# Patient Record
Sex: FEMALE | URBAN - METROPOLITAN AREA
[De-identification: names, ages, dates, MRNs, and addresses within clinical notes are randomized per-mention and may not be internally consistent; named-entity substitution may affect disease eponyms.]

---

## 2015-03-16 LAB
EXT 24 HR UR METANEPHRINE: NORMAL
EXT 24 HR UR NORMETANEPHRINE: NORMAL
EXT 24 HR UR NORMETANEPHRINE: NORMAL
EXT 25 HYDROXY VIT D2: NORMAL
EXT 25 HYDROXY VIT D3: NORMAL
EXT 5 HIAA 24 HR URINE: NORMAL
EXT 5 HIAA BLOOD: NORMAL
EXT ACTH: NORMAL
EXT AFP: NORMAL
EXT ALBUMIN: NORMAL
EXT ALKALINE PHOSPHATASE: NORMAL
EXT ALT: NORMAL
EXT AMYLASE: NORMAL
EXT ANTI ISLET CELL AB: NORMAL
EXT ANTI PARIETAL CELL AB: NORMAL
EXT ANTI THYROID AB: NORMAL
EXT AST: NORMAL
EXT BILIRUBIN DIRECT: NORMAL MG/DL
EXT BILIRUBIN TOTAL: NORMAL
EXT BK VIRUS DNA QN PCR: NORMAL
EXT BUN: NORMAL
EXT C PEPTIDE: NORMAL
EXT CA 125: NORMAL
EXT CA 19-9: NORMAL
EXT CA 27-29: NORMAL
EXT CALCITONIN: NORMAL
EXT CALCIUM: NORMAL
EXT CEA: 1.2 NG/ML (ref 0–4.7)
EXT CHLORIDE: NORMAL
EXT CHOLESTEROL: NORMAL
EXT CHROMOGRANIN A: 4 NMOL/L (ref 0–5)
EXT CO2: NORMAL
EXT CREATININE UA: NORMAL
EXT CREATININE: NORMAL MG/DL
EXT CYCLOSPORONE LEVEL: NORMAL
EXT DOPAMINE: NORMAL
EXT EBV DNA BY PCR: NORMAL
EXT EPINEPHRINE: NORMAL
EXT FOLATE: NORMAL
EXT FREE T3: NORMAL
EXT FREE T4: NORMAL
EXT FSH: NORMAL
EXT GASTRIN RELEASING PEPTIDE: NORMAL
EXT GASTRIN RELEASING PEPTIDE: NORMAL
EXT GASTRIN: NORMAL
EXT GGT: NORMAL
EXT GHRELIN: NORMAL
EXT GLUCAGON: NORMAL
EXT GLUCOSE: NORMAL
EXT GROWTH HORMONE: NORMAL
EXT HCV RNA QUANT PCR: NORMAL
EXT HDL: NORMAL
EXT HEMATOCRIT: NORMAL
EXT HEMOGLOBIN A1C: NORMAL %
EXT HEMOGLOBIN: NORMAL
EXT HISTAMINE 24 HR URINE: NORMAL
EXT HISTAMINE: NORMAL
EXT IGF-1: NORMAL
EXT IMMUNKNOW (NON-STIMULATED): NORMAL
EXT IMMUNKNOW (STIMULATED): NORMAL
EXT INR: NORMAL
EXT INSULIN: NORMAL
EXT LANREOTIDE LEVEL: NORMAL
EXT LDH, TOTAL: 137 IU/L (ref 119–226)
EXT LDL CHOLESTEROL: NORMAL
EXT LIPASE: NORMAL
EXT MAGNESIUM: NORMAL
EXT METANEPHRINE FREE PLASMA: NORMAL
EXT MOTILIN: NORMAL
EXT NEUROKININ A CAMB: NORMAL
EXT NEUROKININ A ISI: NORMAL
EXT NEUROTENSIN: NORMAL
EXT NOREPINEPHRINE: NORMAL
EXT NORMETANEPHRINE: NORMAL
EXT NSE: NORMAL
EXT OCTREOTIDE LEVEL: NORMAL
EXT PANCREASTATIN CAMB: NORMAL
EXT PANCREASTATIN ISI: NORMAL
EXT PANCREATIC POLYPEPTIDE: NORMAL
EXT PHOSPHORUS: NORMAL
EXT PLATELETS: NORMAL
EXT POTASSIUM: NORMAL
EXT PROGRAF LEVEL: NORMAL
EXT PROLACTIN: NORMAL
EXT PROTEIN TOTAL: NORMAL
EXT PROTEIN UA: NORMAL
EXT PT: NORMAL
EXT PTH, INTACT: NORMAL
EXT PTT: NORMAL
EXT RAPAMUNE LEVEL: NORMAL
EXT SEROTONIN: 84 NG/ML (ref 0–420)
EXT SODIUM: NORMAL MMOL/L
EXT SOMATOSTATIN: NORMAL
EXT SUBSTANCE P: NORMAL
EXT TRIGLYCERIDES: NORMAL
EXT TRYPTASE: NORMAL
EXT TSH: NORMAL
EXT URIC ACID: NORMAL
EXT URINE AMYLASE U/HR: NORMAL
EXT URINE AMYLASE U/L: NORMAL
EXT VASOACTIVE INTESTINAL POLYPEPTIDE: NORMAL
EXT VITAMIN B12: NORMAL
EXT VMA 24 HR URINE: NORMAL
EXT WBC: NORMAL
NEURON SPECIFIC ENOLASE: 5.7 NG/ML (ref 0–12.5)

## 2016-06-09 LAB
EXT 24 HR UR METANEPHRINE: NORMAL
EXT 24 HR UR NORMETANEPHRINE: NORMAL
EXT 24 HR UR NORMETANEPHRINE: NORMAL
EXT 25 HYDROXY VIT D2: NORMAL
EXT 25 HYDROXY VIT D3: NORMAL
EXT 5 HIAA 24 HR URINE: NORMAL
EXT 5 HIAA BLOOD: NORMAL
EXT ACTH: NORMAL
EXT AFP: NORMAL
EXT ALBUMIN: NORMAL
EXT ALKALINE PHOSPHATASE: NORMAL
EXT ALT: NORMAL
EXT AMYLASE: NORMAL
EXT ANTI ISLET CELL AB: NORMAL
EXT ANTI PARIETAL CELL AB: NORMAL
EXT ANTI THYROID AB: NORMAL
EXT AST: NORMAL
EXT BILIRUBIN DIRECT: NORMAL MG/DL
EXT BILIRUBIN TOTAL: NORMAL
EXT BK VIRUS DNA QN PCR: NORMAL
EXT BUN: NORMAL
EXT C PEPTIDE: NORMAL
EXT CA 125: NORMAL
EXT CA 19-9: NORMAL
EXT CA 27-29: NORMAL
EXT CALCITONIN: NORMAL
EXT CALCIUM: NORMAL
EXT CEA: NORMAL
EXT CHLORIDE: NORMAL
EXT CHOLESTEROL: NORMAL
EXT CHROMOGRANIN A: NORMAL
EXT CO2: NORMAL
EXT CREATININE UA: NORMAL
EXT CREATININE: NORMAL MG/DL
EXT CYCLOSPORONE LEVEL: NORMAL
EXT DOPAMINE: NORMAL
EXT EBV DNA BY PCR: NORMAL
EXT EPINEPHRINE: NORMAL
EXT FOLATE: NORMAL
EXT FREE T3: NORMAL
EXT FREE T4: NORMAL
EXT FSH: NORMAL
EXT GASTRIN RELEASING PEPTIDE: NORMAL
EXT GASTRIN RELEASING PEPTIDE: NORMAL
EXT GASTRIN: NORMAL
EXT GGT: NORMAL
EXT GHRELIN: NORMAL
EXT GLUCAGON: NORMAL
EXT GLUCOSE: NORMAL
EXT GROWTH HORMONE: NORMAL
EXT HCV RNA QUANT PCR: NORMAL
EXT HDL: NORMAL
EXT HEMATOCRIT: NORMAL
EXT HEMOGLOBIN A1C: NORMAL %
EXT HEMOGLOBIN: NORMAL
EXT HISTAMINE 24 HR URINE: NORMAL
EXT HISTAMINE: NORMAL
EXT IGF-1: NORMAL
EXT IMMUNKNOW (NON-STIMULATED): NORMAL
EXT IMMUNKNOW (STIMULATED): NORMAL
EXT INR: NORMAL
EXT INSULIN: NORMAL
EXT LANREOTIDE LEVEL: NORMAL
EXT LDH, TOTAL: NORMAL
EXT LDL CHOLESTEROL: NORMAL
EXT LIPASE: NORMAL
EXT MAGNESIUM: NORMAL
EXT METANEPHRINE FREE PLASMA: NORMAL
EXT MOTILIN: NORMAL
EXT NEUROKININ A CAMB: NORMAL
EXT NEUROKININ A ISI: NORMAL
EXT NEUROTENSIN: NORMAL
EXT NOREPINEPHRINE: NORMAL
EXT NORMETANEPHRINE: NORMAL
EXT NSE: NORMAL
EXT OCTREOTIDE LEVEL: NORMAL
EXT PANCREASTATIN CAMB: NORMAL
EXT PANCREASTATIN ISI: NORMAL NG/ML (ref 0–15)
EXT PANCREATIC POLYPEPTIDE: NORMAL
EXT PHOSPHORUS: NORMAL
EXT PLATELETS: NORMAL
EXT POTASSIUM: NORMAL
EXT PROGRAF LEVEL: NORMAL
EXT PROLACTIN: NORMAL
EXT PROTEIN TOTAL: NORMAL
EXT PROTEIN UA: NORMAL
EXT PT: NORMAL
EXT PTH, INTACT: NORMAL
EXT PTT: NORMAL
EXT RAPAMUNE LEVEL: NORMAL
EXT SEROTONIN: 149 NG/ML (ref 56–244)
EXT SODIUM: NORMAL MMOL/L
EXT SOMATOSTATIN: NORMAL
EXT SUBSTANCE P: NORMAL
EXT TRIGLYCERIDES: NORMAL
EXT TRYPTASE: NORMAL
EXT TSH: NORMAL
EXT URIC ACID: NORMAL
EXT URINE AMYLASE U/HR: NORMAL
EXT URINE AMYLASE U/L: NORMAL
EXT VASOACTIVE INTESTINAL POLYPEPTIDE: NORMAL
EXT VITAMIN B12: NORMAL
EXT VMA 24 HR URINE: NORMAL
EXT WBC: NORMAL
NEURON SPECIFIC ENOLASE: NORMAL

## 2017-12-20 LAB
EXT 24 HR UR METANEPHRINE: ABNORMAL
EXT 24 HR UR NORMETANEPHRINE: ABNORMAL
EXT 24 HR UR NORMETANEPHRINE: ABNORMAL
EXT 25 HYDROXY VIT D2: ABNORMAL
EXT 25 HYDROXY VIT D3: ABNORMAL
EXT 5 HIAA 24 HR URINE: ABNORMAL
EXT 5 HIAA BLOOD: ABNORMAL
EXT ACTH: ABNORMAL
EXT AFP: ABNORMAL
EXT ALBUMIN: ABNORMAL
EXT ALKALINE PHOSPHATASE: ABNORMAL
EXT ALT: ABNORMAL
EXT AMYLASE: ABNORMAL
EXT ANTI ISLET CELL AB: ABNORMAL
EXT ANTI PARIETAL CELL AB: ABNORMAL
EXT ANTI THYROID AB: ABNORMAL
EXT AST: ABNORMAL
EXT BILIRUBIN DIRECT: ABNORMAL MG/DL
EXT BILIRUBIN TOTAL: ABNORMAL
EXT BK VIRUS DNA QN PCR: ABNORMAL
EXT BUN: ABNORMAL
EXT C PEPTIDE: ABNORMAL
EXT CA 125: ABNORMAL
EXT CA 19-9: ABNORMAL
EXT CA 27-29: ABNORMAL
EXT CALCITONIN: ABNORMAL
EXT CALCIUM: ABNORMAL
EXT CEA: ABNORMAL
EXT CHLORIDE: ABNORMAL
EXT CHOLESTEROL: ABNORMAL
EXT CHROMOGRANIN A: 146 NG/ML (ref 25–140)
EXT CO2: ABNORMAL
EXT CREATININE UA: ABNORMAL
EXT CREATININE: ABNORMAL MG/DL
EXT CYCLOSPORONE LEVEL: ABNORMAL
EXT DOPAMINE: ABNORMAL
EXT EBV DNA BY PCR: ABNORMAL
EXT EPINEPHRINE: ABNORMAL
EXT FOLATE: ABNORMAL
EXT FREE T3: ABNORMAL
EXT FREE T4: ABNORMAL
EXT FSH: ABNORMAL
EXT GASTRIN RELEASING PEPTIDE: ABNORMAL
EXT GASTRIN RELEASING PEPTIDE: ABNORMAL
EXT GASTRIN: ABNORMAL
EXT GGT: ABNORMAL
EXT GHRELIN: ABNORMAL
EXT GLUCAGON: ABNORMAL
EXT GLUCOSE: ABNORMAL
EXT GROWTH HORMONE: ABNORMAL
EXT HCV RNA QUANT PCR: ABNORMAL
EXT HDL: ABNORMAL
EXT HEMATOCRIT: ABNORMAL
EXT HEMOGLOBIN A1C: ABNORMAL %
EXT HEMOGLOBIN: ABNORMAL
EXT HISTAMINE 24 HR URINE: ABNORMAL
EXT HISTAMINE: ABNORMAL
EXT IGF-1: ABNORMAL
EXT IMMUNKNOW (NON-STIMULATED): ABNORMAL
EXT IMMUNKNOW (STIMULATED): ABNORMAL
EXT INR: ABNORMAL
EXT INSULIN: ABNORMAL
EXT LANREOTIDE LEVEL: ABNORMAL
EXT LDH, TOTAL: ABNORMAL
EXT LDL CHOLESTEROL: ABNORMAL
EXT LIPASE: ABNORMAL
EXT MAGNESIUM: ABNORMAL
EXT METANEPHRINE FREE PLASMA: ABNORMAL
EXT MOTILIN: ABNORMAL
EXT NEUROKININ A CAMB: ABNORMAL
EXT NEUROKININ A ISI: ABNORMAL
EXT NEUROTENSIN: ABNORMAL
EXT NOREPINEPHRINE: ABNORMAL
EXT NORMETANEPHRINE: ABNORMAL
EXT NSE: ABNORMAL
EXT OCTREOTIDE LEVEL: ABNORMAL
EXT PANCREASTATIN CAMB: ABNORMAL
EXT PANCREASTATIN ISI: ABNORMAL
EXT PANCREATIC POLYPEPTIDE: ABNORMAL
EXT PHOSPHORUS: ABNORMAL
EXT PLATELETS: ABNORMAL
EXT POTASSIUM: ABNORMAL
EXT PROGRAF LEVEL: ABNORMAL
EXT PROLACTIN: ABNORMAL
EXT PROTEIN TOTAL: ABNORMAL
EXT PROTEIN UA: ABNORMAL
EXT PT: ABNORMAL
EXT PTH, INTACT: ABNORMAL
EXT PTT: ABNORMAL
EXT RAPAMUNE LEVEL: ABNORMAL
EXT SEROTONIN: 92 NG/ML (ref 56–244)
EXT SODIUM: ABNORMAL MMOL/L
EXT SOMATOSTATIN: ABNORMAL
EXT SUBSTANCE P: ABNORMAL
EXT TRIGLYCERIDES: ABNORMAL
EXT TRYPTASE: ABNORMAL
EXT TSH: ABNORMAL
EXT URIC ACID: ABNORMAL
EXT URINE AMYLASE U/HR: ABNORMAL
EXT URINE AMYLASE U/L: ABNORMAL
EXT VASOACTIVE INTESTINAL POLYPEPTIDE: ABNORMAL
EXT VITAMIN B12: ABNORMAL
EXT VMA 24 HR URINE: ABNORMAL
EXT WBC: ABNORMAL
NEURON SPECIFIC ENOLASE: ABNORMAL

## 2019-02-27 LAB
EXT 24 HR UR METANEPHRINE: ABNORMAL
EXT 24 HR UR NORMETANEPHRINE: ABNORMAL
EXT 24 HR UR NORMETANEPHRINE: ABNORMAL
EXT 25 HYDROXY VIT D2: ABNORMAL
EXT 25 HYDROXY VIT D3: ABNORMAL
EXT 5 HIAA 24 HR URINE: ABNORMAL
EXT 5 HIAA BLOOD: ABNORMAL
EXT ACTH: ABNORMAL
EXT AFP: ABNORMAL
EXT ALBUMIN: ABNORMAL
EXT ALKALINE PHOSPHATASE: ABNORMAL
EXT ALT: ABNORMAL
EXT AMYLASE: ABNORMAL
EXT ANTI ISLET CELL AB: ABNORMAL
EXT ANTI PARIETAL CELL AB: ABNORMAL
EXT ANTI THYROID AB: ABNORMAL
EXT AST: ABNORMAL
EXT BILIRUBIN DIRECT: ABNORMAL MG/DL
EXT BILIRUBIN TOTAL: ABNORMAL
EXT BK VIRUS DNA QN PCR: ABNORMAL
EXT BUN: ABNORMAL
EXT C PEPTIDE: ABNORMAL
EXT CA 125: ABNORMAL
EXT CA 19-9: ABNORMAL
EXT CA 27-29: ABNORMAL
EXT CALCITONIN: ABNORMAL
EXT CALCIUM: ABNORMAL
EXT CEA: ABNORMAL
EXT CHLORIDE: ABNORMAL
EXT CHOLESTEROL: ABNORMAL
EXT CHROMOGRANIN A: 138 NG/ML (ref 0–95)
EXT CO2: ABNORMAL
EXT CREATININE UA: ABNORMAL
EXT CREATININE: ABNORMAL MG/DL
EXT CYCLOSPORONE LEVEL: ABNORMAL
EXT DOPAMINE: ABNORMAL
EXT EBV DNA BY PCR: ABNORMAL
EXT EPINEPHRINE: ABNORMAL
EXT FOLATE: ABNORMAL
EXT FREE T3: ABNORMAL
EXT FREE T4: ABNORMAL
EXT FSH: ABNORMAL
EXT GASTRIN RELEASING PEPTIDE: ABNORMAL
EXT GASTRIN RELEASING PEPTIDE: ABNORMAL
EXT GASTRIN: ABNORMAL
EXT GGT: ABNORMAL
EXT GHRELIN: ABNORMAL
EXT GLUCAGON: ABNORMAL
EXT GLUCOSE: ABNORMAL
EXT GROWTH HORMONE: ABNORMAL
EXT HCV RNA QUANT PCR: ABNORMAL
EXT HDL: ABNORMAL
EXT HEMATOCRIT: ABNORMAL
EXT HEMOGLOBIN A1C: ABNORMAL %
EXT HEMOGLOBIN: ABNORMAL
EXT HISTAMINE 24 HR URINE: ABNORMAL
EXT HISTAMINE: ABNORMAL
EXT IGF-1: ABNORMAL
EXT IMMUNKNOW (NON-STIMULATED): ABNORMAL
EXT IMMUNKNOW (STIMULATED): ABNORMAL
EXT INR: ABNORMAL
EXT INSULIN: ABNORMAL
EXT LANREOTIDE LEVEL: ABNORMAL
EXT LDH, TOTAL: ABNORMAL
EXT LDL CHOLESTEROL: ABNORMAL
EXT LIPASE: ABNORMAL
EXT MAGNESIUM: ABNORMAL
EXT METANEPHRINE FREE PLASMA: ABNORMAL
EXT MOTILIN: ABNORMAL
EXT NEUROKININ A CAMB: ABNORMAL
EXT NEUROKININ A ISI: ABNORMAL
EXT NEUROTENSIN: ABNORMAL
EXT NOREPINEPHRINE: ABNORMAL
EXT NORMETANEPHRINE: ABNORMAL
EXT NSE: ABNORMAL
EXT OCTREOTIDE LEVEL: ABNORMAL
EXT PANCREASTATIN CAMB: ABNORMAL
EXT PANCREASTATIN ISI: ABNORMAL
EXT PANCREATIC POLYPEPTIDE: ABNORMAL
EXT PHOSPHORUS: ABNORMAL
EXT PLATELETS: ABNORMAL
EXT POTASSIUM: ABNORMAL
EXT PROGRAF LEVEL: ABNORMAL
EXT PROLACTIN: ABNORMAL
EXT PROTEIN TOTAL: ABNORMAL
EXT PROTEIN UA: ABNORMAL
EXT PT: ABNORMAL
EXT PTH, INTACT: ABNORMAL
EXT PTT: ABNORMAL
EXT RAPAMUNE LEVEL: ABNORMAL
EXT SEROTONIN: 170 NG/ML (ref 50–220)
EXT SODIUM: ABNORMAL MMOL/L
EXT SOMATOSTATIN: ABNORMAL
EXT SUBSTANCE P: ABNORMAL
EXT TRIGLYCERIDES: ABNORMAL
EXT TRYPTASE: ABNORMAL
EXT TSH: ABNORMAL
EXT URIC ACID: ABNORMAL
EXT URINE AMYLASE U/HR: ABNORMAL
EXT URINE AMYLASE U/L: ABNORMAL
EXT VASOACTIVE INTESTINAL POLYPEPTIDE: ABNORMAL
EXT VITAMIN B12: ABNORMAL
EXT VMA 24 HR URINE: ABNORMAL
EXT WBC: ABNORMAL
NEURON SPECIFIC ENOLASE: ABNORMAL

## 2019-03-12 LAB
EXT 24 HR UR METANEPHRINE: NORMAL
EXT 24 HR UR NORMETANEPHRINE: NORMAL
EXT 24 HR UR NORMETANEPHRINE: NORMAL
EXT 25 HYDROXY VIT D2: NORMAL
EXT 25 HYDROXY VIT D3: NORMAL
EXT 5 HIAA 24 HR URINE: NORMAL
EXT 5 HIAA BLOOD: NORMAL
EXT ACTH: NORMAL
EXT AFP: NORMAL
EXT ALBUMIN: 4.3 G/DL (ref 3.5–5)
EXT ALKALINE PHOSPHATASE: 54 IU/L (ref 38–126)
EXT ALT: 15 IU/L (ref 10–60)
EXT AMYLASE: NORMAL
EXT ANTI ISLET CELL AB: NORMAL
EXT ANTI PARIETAL CELL AB: NORMAL
EXT ANTI THYROID AB: NORMAL
EXT AST: 17 IU/L (ref 10–42)
EXT BILIRUBIN DIRECT: NORMAL
EXT BILIRUBIN TOTAL: 0.7 MG/DL (ref 0.2–1.3)
EXT BK VIRUS DNA QN PCR: NORMAL
EXT BUN: 17 MG/DL (ref 5–25)
EXT C PEPTIDE: NORMAL
EXT CA 125: NORMAL
EXT CA 19-9: NORMAL
EXT CA 27-29: NORMAL
EXT CALCITONIN: NORMAL
EXT CALCIUM: 9.2 MG/DL (ref 8.5–10.5)
EXT CEA: NORMAL
EXT CHLORIDE: 103 MMOL/L (ref 96–110)
EXT CHOLESTEROL: NORMAL
EXT CHROMOGRANIN A: NORMAL
EXT CO2: 26 MMOL/L (ref 24–31)
EXT CREATININE UA: NORMAL
EXT CREATININE: 0.92 MG/DL (ref 0.44–1)
EXT CYCLOSPORONE LEVEL: NORMAL
EXT DOPAMINE: NORMAL
EXT EBV DNA BY PCR: NORMAL
EXT EPINEPHRINE: NORMAL
EXT FOLATE: NORMAL
EXT FREE T3: NORMAL
EXT FREE T4: NORMAL
EXT FSH: NORMAL
EXT GASTRIN RELEASING PEPTIDE: NORMAL
EXT GASTRIN RELEASING PEPTIDE: NORMAL
EXT GASTRIN: NORMAL
EXT GGT: NORMAL
EXT GHRELIN: NORMAL
EXT GLUCAGON: NORMAL
EXT GLUCOSE: 77 MG/DL (ref 70–99)
EXT GROWTH HORMONE: NORMAL
EXT HCV RNA QUANT PCR: NORMAL
EXT HDL: NORMAL
EXT HEMATOCRIT: 44.6 % (ref 36–45)
EXT HEMOGLOBIN A1C: NORMAL %
EXT HEMOGLOBIN: 14.3 G/DL (ref 12.3–15.3)
EXT HISTAMINE 24 HR URINE: NORMAL
EXT HISTAMINE: NORMAL
EXT IGF-1: NORMAL
EXT IMMUNKNOW (NON-STIMULATED): NORMAL
EXT IMMUNKNOW (STIMULATED): NORMAL
EXT INR: NORMAL
EXT INSULIN: NORMAL
EXT LANREOTIDE LEVEL: NORMAL
EXT LDH, TOTAL: NORMAL
EXT LDL CHOLESTEROL: NORMAL
EXT LIPASE: NORMAL
EXT MAGNESIUM: NORMAL
EXT METANEPHRINE FREE PLASMA: NORMAL
EXT MOTILIN: NORMAL
EXT NEUROKININ A CAMB: NORMAL
EXT NEUROKININ A ISI: NORMAL
EXT NEUROTENSIN: NORMAL
EXT NOREPINEPHRINE: NORMAL
EXT NORMETANEPHRINE: NORMAL
EXT NSE: NORMAL
EXT OCTREOTIDE LEVEL: NORMAL
EXT PANCREASTATIN CAMB: NORMAL
EXT PANCREASTATIN ISI: NORMAL
EXT PANCREATIC POLYPEPTIDE: NORMAL
EXT PHOSPHORUS: NORMAL
EXT PLATELETS: 280 X10^3/UL (ref 172–450)
EXT POTASSIUM: 4.3 MMOL/L (ref 3.5–5.2)
EXT PROGRAF LEVEL: NORMAL
EXT PROLACTIN: NORMAL
EXT PROTEIN TOTAL: 6.8 G/DL (ref 6–8)
EXT PROTEIN UA: NORMAL
EXT PT: NORMAL
EXT PTH, INTACT: NORMAL
EXT PTT: NORMAL
EXT RAPAMUNE LEVEL: NORMAL
EXT SEROTONIN: NORMAL
EXT SODIUM: 137 MMOL/L (ref 136–145)
EXT SOMATOSTATIN: NORMAL
EXT SUBSTANCE P: NORMAL
EXT TRIGLYCERIDES: NORMAL
EXT TRYPTASE: NORMAL
EXT TSH: NORMAL
EXT URIC ACID: NORMAL
EXT URINE AMYLASE U/HR: NORMAL
EXT URINE AMYLASE U/L: NORMAL
EXT VASOACTIVE INTESTINAL POLYPEPTIDE: NORMAL
EXT VITAMIN B12: NORMAL
EXT VMA 24 HR URINE: NORMAL
EXT WBC: 7.1 X10^3/UL (ref 4.4–11.3)
NEURON SPECIFIC ENOLASE: NORMAL

## 2019-03-19 ENCOUNTER — TELEPHONE (OUTPATIENT)
Dept: NEUROLOGY | Facility: HOSPITAL | Age: 49
End: 2019-03-19

## 2019-03-19 NOTE — TELEPHONE ENCOUNTER
----- Message from Keerthi Bill sent at 3/12/2019  9:43 AM CDT -----  Regarding: New Patient Call  Contact: [phone]  EAW/NEW:     Resl[place]:  Dr. Mcdonnell    [facility] - all records there.  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  Has not been on treatment.    Dr. Hernan Ackerman [facility] onco, getting that info to put into Care Team - [facility] [place] [facility].    Choromogranin A is elevated.  This has never happened before and caused some alarm.      Getting bloodwork every year.  No scans for about 2 years due to stroke.  Has had Echo about 6 months ago.    Sometimes SOB, flushing.      Going with daughter for [employer] tourney in [place] and leaving Thursday night.  Will not be available for 10 days after that.

## 2019-04-15 ENCOUNTER — TELEPHONE (OUTPATIENT)
Dept: NEUROLOGY | Facility: HOSPITAL | Age: 49
End: 2019-04-15